# Patient Record
Sex: FEMALE | Race: WHITE | URBAN - METROPOLITAN AREA
[De-identification: names, ages, dates, MRNs, and addresses within clinical notes are randomized per-mention and may not be internally consistent; named-entity substitution may affect disease eponyms.]

---

## 2019-05-30 ENCOUNTER — OFFICE VISIT (OUTPATIENT)
Dept: FAMILY MEDICINE | Facility: CLINIC | Age: 59
End: 2019-05-30
Payer: COMMERCIAL

## 2019-05-30 VITALS
HEART RATE: 107 BPM | BODY MASS INDEX: 32.43 KG/M2 | OXYGEN SATURATION: 100 % | HEIGHT: 68 IN | TEMPERATURE: 98.6 F | DIASTOLIC BLOOD PRESSURE: 84 MMHG | SYSTOLIC BLOOD PRESSURE: 122 MMHG | WEIGHT: 214 LBS

## 2019-05-30 DIAGNOSIS — J20.9 ACUTE BRONCHITIS WITH COEXISTING CONDITION REQUIRING PROPHYLACTIC TREATMENT: ICD-10-CM

## 2019-05-30 DIAGNOSIS — R05.9 COUGH: Primary | ICD-10-CM

## 2019-05-30 PROCEDURE — 99213 OFFICE O/P EST LOW 20 MIN: CPT | Performed by: FAMILY MEDICINE

## 2019-05-30 RX ORDER — AZELASTINE 1 MG/ML
2 SPRAY, METERED NASAL
COMMUNITY
Start: 2019-05-16

## 2019-05-30 RX ORDER — PREDNISONE 20 MG/1
40 TABLET ORAL DAILY
Qty: 10 TABLET | Refills: 0 | Status: SHIPPED | OUTPATIENT
Start: 2019-05-30

## 2019-05-30 RX ORDER — LORATADINE 10 MG/1
CAPSULE, LIQUID FILLED ORAL
COMMUNITY

## 2019-05-30 RX ORDER — ZOLPIDEM TARTRATE 10 MG/1
10 TABLET ORAL
COMMUNITY
Start: 2019-05-16

## 2019-05-30 RX ORDER — AZITHROMYCIN 250 MG/1
TABLET, FILM COATED ORAL
Qty: 6 TABLET | Refills: 0 | Status: SHIPPED | OUTPATIENT
Start: 2019-05-30 | End: 2019-06-04

## 2019-05-30 RX ORDER — ALBUTEROL SULFATE 0.83 MG/ML
2.5 SOLUTION RESPIRATORY (INHALATION)
COMMUNITY
Start: 2019-03-05

## 2019-05-30 SDOH — HEALTH STABILITY: MENTAL HEALTH: HOW OFTEN DO YOU HAVE A DRINK CONTAINING ALCOHOL?: NEVER

## 2019-05-30 ASSESSMENT — MIFFLIN-ST. JEOR: SCORE: 1594.2

## 2019-05-30 NOTE — PROGRESS NOTES
"Subjective     Zenaida Mcnamara is a 59 year old female who presents to clinic today for the following health issues:  Acute Illness   Acute illness concerns: URI   Hx of bronchitis and pneumonia   Visiting from [place]   Onset: X1 week     Fever: no    Chills/Sweats: no    Headache (location?): no    Sinus Pressure:YES- post-nasal drainage    Conjunctivitis:  no    Ear Pain: no    Rhinorrhea: YES    Congestion: YES    Sore Throat: no     Cough: YES-productive of yellow sputum, productive of green sputum    Wheeze: no    Decreased Appetite: YES    Nausea: no    Vomiting: no    Diarrhea:  no    Dysuria/Freq.: no    Fatigue/Achiness: YES  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   Therapies Tried and outcome: claritin- D, mucinex and Flonase - slight releif   Patient has trip coming up and would like to feel better for this she reports that symptoms are not getting better    Recent flight   {additonal problems for provider to add (Optional):744281}    {HIST REVIEW/ LINKS 2 (Optional):930740}    {Additional problems for the provider to add (optional):385286}  Reviewed and updated as needed this visit by Provider         Review of Systems   {ROS COMP (Optional):986658}      Objective    /84   Pulse 107   Temp 98.6  F (37  C) (Oral)   Ht 1.727 m (5' 8\")   Wt 97.1 kg (214 lb)   SpO2 100%   BMI 32.54 kg/m    Body mass index is 32.54 kg/m .  Physical Exam   {Exam List (Optional):722754}    {Diagnostic Test Results (Optional):755773::\"Diagnostic Test Results:\",\"Labs reviewed in Epic\"}        {PROVIDER CHARTING PREFERENCE:377270}      "

## 2019-05-30 NOTE — PROGRESS NOTES
SUBJECTIVE:   Zenaida Mcnamara is a 59 year old female who complains of nasal congestion, nasal blockage and runny nose for 4  days. She denies a history of asthma. She denies a history of asthma. Patient does not smoke cigarettes.    Has had this in the past after plane flight. She does  Have productive cough and congestion.      OBJECTIVE:  Vitals as noted by Nurse/MA above.  Appearance: in no apparent distress.   ENT- ENT exam normal, no neck nodes or sinus tenderness.   Chest -course breath sounds, no tachypnea, retractions or cyanosis and S1, S2 normal, no murmur, no gallop, rate regular.    ASSESSMENT:   Bronchitis  URI    PLAN:  Symptomatic therapy suggested: push fluids and rest. Call or return to clinic prn if these symptoms worsen or fail to improve as anticipated.